# Patient Record
Sex: MALE | Race: BLACK OR AFRICAN AMERICAN | Employment: FULL TIME | URBAN - METROPOLITAN AREA
[De-identification: names, ages, dates, MRNs, and addresses within clinical notes are randomized per-mention and may not be internally consistent; named-entity substitution may affect disease eponyms.]

---

## 2021-02-20 ENCOUNTER — HOSPITAL ENCOUNTER (EMERGENCY)
Age: 21
Discharge: HOME | End: 2021-02-21
Attending: EMERGENCY MEDICINE

## 2021-02-20 ENCOUNTER — APPOINTMENT (OUTPATIENT)
Dept: CT IMAGING | Age: 21
End: 2021-02-20

## 2021-02-20 ENCOUNTER — APPOINTMENT (OUTPATIENT)
Dept: GENERAL RADIOLOGY | Age: 21
End: 2021-02-20

## 2021-02-20 DIAGNOSIS — S09.90XA INJURY OF HEAD, INITIAL ENCOUNTER: ICD-10-CM

## 2021-02-20 DIAGNOSIS — I95.1 ORTHOSTATIC SYNCOPE: Primary | ICD-10-CM

## 2021-02-20 DIAGNOSIS — S02.5XXA CLOSED FRACTURE OF TOOTH, INITIAL ENCOUNTER: ICD-10-CM
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
## 2021-02-20 DIAGNOSIS — S01.81XA FACIAL LACERATION, INITIAL ENCOUNTER: ICD-10-CM

## 2021-02-20 DIAGNOSIS — S02.5XXA: ICD-10-CM

## 2021-02-20 LAB
A/G RATIO: 1.2 (ref 1.1–2.2)
ALBUMIN SERPL-MCNC: 4.6 G/DL (ref 3.4–5)
ALP BLD-CCNC: 63 U/L (ref 40–129)
ALT SERPL-CCNC: 13 U/L (ref 10–40)
ANION GAP SERPL CALCULATED.3IONS-SCNC: 9 MMOL/L (ref 3–16)
AST SERPL-CCNC: 57 U/L (ref 15–37)
BASOPHILS ABSOLUTE: 0.1 K/UL (ref 0–0.2)
BASOPHILS RELATIVE PERCENT: 0.5 %
BILIRUB SERPL-MCNC: 0.5 MG/DL (ref 0–1)
BUN BLDV-MCNC: 11 MG/DL (ref 7–20)
CALCIUM SERPL-MCNC: 9.4 MG/DL (ref 8.3–10.6)
CHLORIDE BLD-SCNC: 101 MMOL/L (ref 99–110)
CO2: 26 MMOL/L (ref 21–32)
CREAT SERPL-MCNC: 1 MG/DL (ref 0.9–1.3)
EOSINOPHILS ABSOLUTE: 0.1 K/UL (ref 0–0.6)
EOSINOPHILS RELATIVE PERCENT: 1.2 %
GFR AFRICAN AMERICAN: >60
GFR NON-AFRICAN AMERICAN: >60
GLOBULIN: 3.7 G/DL
GLUCOSE BLD-MCNC: 154 MG/DL (ref 70–99)
HCT VFR BLD CALC: 45.8 % (ref 40.5–52.5)
HEMOGLOBIN: 15.1 G/DL (ref 13.5–17.5)
LYMPHOCYTES ABSOLUTE: 1.7 K/UL (ref 1–5.1)
LYMPHOCYTES RELATIVE PERCENT: 15 %
MCH RBC QN AUTO: 31.7 PG (ref 26–34)
MCHC RBC AUTO-ENTMCNC: 32.9 G/DL (ref 31–36)
MCV RBC AUTO: 96.3 FL (ref 80–100)
MONOCYTES ABSOLUTE: 0.7 K/UL (ref 0–1.3)
MONOCYTES RELATIVE PERCENT: 6.2 %
NEUTROPHILS ABSOLUTE: 8.6 K/UL (ref 1.7–7.7)
NEUTROPHILS RELATIVE PERCENT: 77.1 %
PDW BLD-RTO: 14.1 % (ref 12.4–15.4)
PLATELET # BLD: 229 K/UL (ref 135–450)
PMV BLD AUTO: 8.2 FL (ref 5–10.5)
POTASSIUM SERPL-SCNC: 3.8 MMOL/L (ref 3.5–5.1)
RBC # BLD: 4.75 M/UL (ref 4.2–5.9)
SODIUM BLD-SCNC: 136 MMOL/L (ref 136–145)
TOTAL PROTEIN: 8.3 G/DL (ref 6.4–8.2)
TROPONIN: <0.01 NG/ML
WBC # BLD: 11.1 K/UL (ref 4–11)

## 2021-02-20 PROCEDURE — 85025 COMPLETE CBC W/AUTO DIFF WBC: CPT

## 2021-02-20 PROCEDURE — 6370000000 HC RX 637 (ALT 250 FOR IP): Performed by: PHYSICIAN ASSISTANT

## 2021-02-20 PROCEDURE — 71045 X-RAY EXAM CHEST 1 VIEW: CPT

## 2021-02-20 PROCEDURE — 70450 CT HEAD/BRAIN W/O DYE: CPT

## 2021-02-20 PROCEDURE — 72125 CT NECK SPINE W/O DYE: CPT

## 2021-02-20 PROCEDURE — 12011 RPR F/E/E/N/L/M 2.5 CM/<: CPT

## 2021-02-20 PROCEDURE — 84484 ASSAY OF TROPONIN QUANT: CPT

## 2021-02-20 PROCEDURE — 70486 CT MAXILLOFACIAL W/O DYE: CPT

## 2021-02-20 PROCEDURE — 93005 ELECTROCARDIOGRAM TRACING: CPT | Performed by: EMERGENCY MEDICINE

## 2021-02-20 PROCEDURE — 80053 COMPREHEN METABOLIC PANEL: CPT

## 2021-02-20 PROCEDURE — 99284 EMERGENCY DEPT VISIT MOD MDM: CPT

## 2021-02-20 RX ORDER — HYDROCODONE BITARTRATE AND ACETAMINOPHEN 5; 325 MG/1; MG/1
1 TABLET ORAL ONCE
Status: COMPLETED | OUTPATIENT
Start: 2021-02-20 | End: 2021-02-20

## 2021-02-20 RX ADMIN — HYDROCODONE BITARTRATE AND ACETAMINOPHEN 1 TABLET: 5; 325 TABLET ORAL at 23:16

## 2021-02-20 RX ADMIN — Medication 3 ML: at 23:17

## 2021-02-20 ASSESSMENT — ENCOUNTER SYMPTOMS
RHINORRHEA: 0
ABDOMINAL PAIN: 0
WHEEZING: 0
COUGH: 0
NAUSEA: 0
VOMITING: 0
SHORTNESS OF BREATH: 0
DIARRHEA: 0

## 2021-02-20 ASSESSMENT — PAIN SCALES - GENERAL: PAINLEVEL_OUTOF10: 9

## 2021-02-21 VITALS
RESPIRATION RATE: 21 BRPM | HEIGHT: 71 IN | HEART RATE: 95 BPM | WEIGHT: 160 LBS | BODY MASS INDEX: 22.4 KG/M2 | OXYGEN SATURATION: 99 % | TEMPERATURE: 98.1 F | SYSTOLIC BLOOD PRESSURE: 121 MMHG | DIASTOLIC BLOOD PRESSURE: 80 MMHG

## 2021-02-21 LAB
EKG ATRIAL RATE: 95 BPM
EKG DIAGNOSIS: NORMAL
EKG P AXIS: 68 DEGREES
EKG P-R INTERVAL: 120 MS
EKG Q-T INTERVAL: 352 MS
EKG QRS DURATION: 108 MS
EKG QTC CALCULATION (BAZETT): 442 MS
EKG R AXIS: 69 DEGREES
EKG T AXIS: 49 DEGREES
EKG VENTRICULAR RATE: 95 BPM

## 2021-02-21 PROCEDURE — 6370000000 HC RX 637 (ALT 250 FOR IP): Performed by: EMERGENCY MEDICINE

## 2021-02-21 PROCEDURE — 2580000003 HC RX 258: Performed by: EMERGENCY MEDICINE

## 2021-02-21 PROCEDURE — 93010 ELECTROCARDIOGRAM REPORT: CPT | Performed by: INTERNAL MEDICINE

## 2021-02-21 RX ORDER — MAGNESIUM HYDROXIDE/ALUMINUM HYDROXICE/SIMETHICONE 120; 1200; 1200 MG/30ML; MG/30ML; MG/30ML
30 SUSPENSION ORAL ONCE
Status: DISCONTINUED | OUTPATIENT
Start: 2021-02-21 | End: 2021-02-21 | Stop reason: HOSPADM

## 2021-02-21 RX ORDER — HYDROCODONE BITARTRATE AND ACETAMINOPHEN 5; 325 MG/1; MG/1
1 TABLET ORAL EVERY 6 HOURS PRN
Qty: 12 TABLET | Refills: 0 | Status: SHIPPED | OUTPATIENT
Start: 2021-02-21 | End: 2021-02-24

## 2021-02-21 RX ORDER — AMOXICILLIN 500 MG/1
500 CAPSULE ORAL 3 TIMES DAILY
Qty: 15 CAPSULE | Refills: 0 | Status: SHIPPED | OUTPATIENT
Start: 2021-02-21 | End: 2021-02-26

## 2021-02-21 RX ORDER — 0.9 % SODIUM CHLORIDE 0.9 %
1000 INTRAVENOUS SOLUTION INTRAVENOUS ONCE
Status: COMPLETED | OUTPATIENT
Start: 2021-02-21 | End: 2021-02-21

## 2021-02-21 RX ORDER — LIDOCAINE HYDROCHLORIDE 20 MG/ML
15 SOLUTION OROPHARYNGEAL
Status: DISCONTINUED | OUTPATIENT
Start: 2021-02-21 | End: 2021-02-21 | Stop reason: HOSPADM

## 2021-02-21 RX ADMIN — LIDOCAINE HYDROCHLORIDE 15 ML: 20 SOLUTION ORAL; TOPICAL at 01:36

## 2021-02-21 RX ADMIN — SODIUM CHLORIDE 1000 ML: 9 INJECTION, SOLUTION INTRAVENOUS at 00:35

## 2021-02-21 ASSESSMENT — PAIN SCALES - GENERAL: PAINLEVEL_OUTOF10: 8

## 2021-02-21 NOTE — ED PROVIDER NOTES
I independently performed a history and physical on Sandy Avina. All diagnostic, treatment, and disposition decisions were made by myself in conjunction with the advanced practice provider. Briefly, this is a 23 y.o. male here for syncope with resultant trauma from the fall. Patient states he just woken up from a nap and went up a flight of stairs. When he got to the top, he states he \"felt fuzzy\", then passed out. He had remembers waking up at the bottom of the steps on the floor. And falling on the stairs, he hit his head and developed an abrasion to his left cheek. He also has a small laceration to his right upper lip and broke multiple teeth. His pain is severe at this time. He states he has passed out like this before. On exam, patient has superficial abrasion to his left cheek with no active bleeding. There is no deformity to the face or facial bones. He has no free movement of the midface. There is stage II dental fractures of the eighth and ninth teeth as well as a stage III dental fracture of the 10th tooth. Patient has a small, half centimeter laceration to superior to the right lip. There is an abrasion to his lower lip without laceration. Patient is awake, alert, and oriented. Heart is tachycardic, regular. Lungs are clear to auscultation. EKG  The Ekg interpreted by me in the absence of a cardiologist shows. normal sinus rhythm with a rate of 95  Axis is   Normal  QTc is  normal  Incomplete RBBB, likely normal variant    No specific ST-T wave changes appreciated. No evidence of acute ischemia. No previous EKGs available for comparison.     MDM I estimate there is LOW risk for ACUTE CORONARY SYNDROME, INTRACRANIAL HEMORRHAGE, MALIGNANT DYSRHYTHMIA, MALIGNANT HYPERTENSION, PULMONARY EMBOLISM, SEPSIS, SUBARACHNOID HEMORRHAGE, SUBDURAL HEMATOMA, STROKE, or THORACIC AORTIC DISSECTION, thus I consider the discharge disposition reasonable. Patient Referrals:  [facility] Emergency Department  [address]  [phone]    As needed, If symptoms worsen or new symptoms develop    [facility]  [address] [facility] [address], [address]  [phone]  On 2/21/2021      [address]  [address] [facility], [address]  Emergency Dental Line - [phone]  Regular line - [phone]  On 2/21/2021      Shumel Mc MD      for re-evaluation      Discharge Medications:  Discharge Medication List as of 2/21/2021  2:21 AM      START taking these medications    Details   HYDROcodone-acetaminophen (NORCO) 5-325 MG per tablet Take 1 tablet by mouth every 6 hours as needed for Pain for up to 3 days. , Disp-12 tablet, R-0Normal      amoxicillin (AMOXIL) 500 MG capsule Take 1 capsule by mouth 3 times daily for 5 days, Disp-15 capsule, R-0Normal             FINAL IMPRESSION  1. Orthostatic syncope    2. Fall down stairs, initial encounter    3. Injury of head, initial encounter    4. Closed fracture of tooth, initial encounter    5. Facial laceration, initial encounter    6. Closed traumatic fracture of tooth, initial encounter        Blood pressure 121/80, pulse 95, temperature 98.1 °F (36.7 °C), resp. rate 21, height 5' 11\" (1.803 m), weight 160 lb (72.6 kg), SpO2 99 %.      For further details of Pretty Lala's emergency department encounter, please see documentation by advanced practice provider, Ariadna Ferguson, [address] [facility], [place]  02/21/21 5665

## 2021-02-21 NOTE — ED PROVIDER NOTES
[address] [facility]        Pt Name: Lucina Lucio  MRN: [id number]  [place] 2000  Date of evaluation: 2/20/2021  Provider: J Carlos El PA-C  PCP: No primary care provider on file. I have seen and evaluated this patient with my supervising physician Dr. Shane Lora. CHIEF COMPLAINT       Chief Complaint   Patient presents with   Acquanetta Ruddle     states he got up too fast, fell down ten steps, + LOC. hit face. lac to lip, broken teeth. abrasion to L cheek. HISTORY OF PRESENT ILLNESS   (Location, Timing/Onset, Context/Setting, Quality, Duration, Modifying Factors, Severity, Associated Signs and Symptoms)  Note limiting factors. Lucina Lucio is a 23 y.o. male who presents for evaluation after fall downstairs. Patient states that he believes he lost consciousness. States that he was taking a nap and jumped up to run upstairs to one of his friends when he got to the top of the stairs started feeling lightheaded and next he knows he is at the bottom of the steps. He is complaining of mouth pain. He denies any significant headache. No dizziness/lightheadedness, weakness or visual disturbances currently. No chest pain or shortness of breath. No abdominal pain nausea vomiting. No leg pain or swelling. No history of similar symptoms or blood clots. Is not anticoagulated. No recent travel hospitalizations or operations. He has no other complaints or concerns at this time. Nursing Notes were all reviewed and agreed with or any disagreements were addressed in the HPI. REVIEW OF SYSTEMS    (2-9 systems for level 4, 10 or more for level 5)     Review of Systems   Constitutional: Negative for appetite change, chills and fever. HENT: Negative for congestion and rhinorrhea. Eyes: Negative for visual disturbance. Respiratory: Negative for cough, shortness of breath and wheezing. Right eye: No discharge. Left eye: No discharge. Extraocular Movements: Extraocular movements intact. Conjunctiva/sclera: Conjunctivae normal.      Pupils: Pupils are equal, round, and reactive to light. Neck:      Musculoskeletal: Normal range of motion and neck supple. Cardiovascular:      Rate and Rhythm: Normal rate and regular rhythm. Heart sounds: Normal heart sounds. Pulmonary:      Effort: Pulmonary effort is normal. No respiratory distress. Breath sounds: Normal breath sounds. Chest:      Chest wall: No tenderness. Abdominal:      General: There is no distension. Palpations: Abdomen is soft. Tenderness: There is no abdominal tenderness. Musculoskeletal: Normal range of motion. Skin:     General: Skin is warm and dry. Neurological:      Mental Status: He is alert and oriented to person, place, and time. Psychiatric:         Behavior: Behavior normal.         DIAGNOSTIC RESULTS   LABS:    Labs Reviewed   CBC WITH AUTO DIFFERENTIAL - Abnormal; Notable for the following components:       Result Value    WBC 11.1 (*)     Neutrophils Absolute 8.6 (*)     All other components within normal limits    Narrative:     Performed at:  [facility] [address], [place], [address] [place], [place] [address]   Phone [phone]   COMPREHENSIVE METABOLIC PANEL   TROPONIN       All other labs were within normal range or not returned as of this dictation. EKG: All EKG's are interpreted by the Emergency Department Physician in the absence of a cardiologist.  Please see their note for interpretation of EKG.       RADIOLOGY:   Non-plain film images such as CT, Ultrasound and MRI are read by the radiologist. Plain radiographic images are visualized and preliminarily interpreted by the ED Provider with the below findings:        Interpretation per the Radiologist below, if available at the time of this note:    [address] [place] [address] Preliminary Result   1. No acute intracranial abnormality. 2. Fractures involving the medial maxillary incisors bilaterally. 3. No other acute facial bone fracture is identified. CT CERVICAL SPINE WO CONTRAST   Final Result   No evidence of an acute fracture or traumatic malalignment involving the   cervical spine         CT HEAD WO CONTRAST   Preliminary Result   1. No acute intracranial abnormality. 2. Fractures involving the medial maxillary incisors bilaterally. 3. No other acute facial bone fracture is identified. XR CHEST PORTABLE   Final Result   1. No acute cardiopulmonary disease. No results found. PROCEDURES   Unless otherwise noted below, none     Procedures  Laceration Repair Procedure Note    Indication: Laceration    Procedure: The patient was placed in the appropriate position and anesthesia around the laceration was obtained by infiltration using LET gel. The area was then cleansed with betadine and draped in a sterile fashion and irrigated with Shur-Clens and normal saline. The laceration was closed with 5-0 Vicryl Rapide using interrupted sutures. There were no additional lacerations requiring repair. The wound area was then dressed with bacitracin. Total repaired wound length: 0.5 cm. Other Items: Suture count: 1    The patient tolerated the procedure well.     Complications: None      CRITICAL CARE TIME   N/A    CONSULTS:  None      EMERGENCY DEPARTMENT COURSE and DIFFERENTIAL DIAGNOSIS/MDM:   Vitals:    Vitals:    02/20/21 2230   BP: (!) 156/83   Pulse: 100   Resp: 17   Temp: 98.1 °F (36.7 °C)   SpO2: 99%   Weight: 160 lb (72.6 kg)   Height: 5' 11\" (1.803 m)       Patient was given the following medications:  Medications   HYDROcodone-acetaminophen (NORCO) 5-325 MG per tablet 1 tablet (1 tablet Oral Given 2/20/21 5252)   lidocaine-EPINEPHrine-tetracaine (LET) topical solution 3 mL syringe (3 mLs Topical Given 2/20/21 1035) Patient presents for evaluation after syncopal episode and fall on stairs. On exam, he is upset and tearful in no acute distress nontoxic. Vitals are stable and he is afebrile. He has an abrasion noted to his left upper cheek, small laceration to right upper lip, several dental fractures and small laceration to inner aspect of the lower mid lip. There is no active bleeding. Remainder of scalp is atraumatic, neck and back are nontender. Lungs are clear to auscultation bilaterally, chest is nontender and abdomen is benign. He is moving all extremities without difficulty or neurologic deficit. There is no additional reproducible tenderness. He is able to ambulate but states that he feels shaky. He was given Norco for symptomatic relief and will be reevaluated. Tetanus is up-to-date. See attending note for EKG interpretation. Wound care provided by nursing staff. Laceration repaired per procedure note. Patient tolerated that difficulty. Portable chest x-ray shows no acute cardiopulmonary disease. Remainder of work-up including labs, CT of the head facial bones and cervical spine are pending at end of shift. Please see attending note for additional information regarding these results, patient reevaluation and disposition. FINAL IMPRESSION      1. Orthostatic syncope    2. Fall down stairs, initial encounter    3. Injury of head, initial encounter    4. Closed fracture of tooth, initial encounter    5. Facial laceration, initial encounter          DISPOSITION/PLAN   DISPOSITION        PATIENT REFERREDTO:  No follow-up provider specified.     DISCHARGE MEDICATIONS:  New Prescriptions    No medications on file       DISCONTINUED MEDICATIONS:  Discontinued Medications    No medications on file              (Please note that portions of this note were completed with a voice recognition program.  Efforts were made to edit the dictations but occasionally words are mis-transcribed.) Santosh Jameson PA-C (electronically signed)           [place], [place]  02/20/21 2448

## 2021-02-21 NOTE — ED NOTES
Discharge instructions provided to patient by RN at bedside. No further concerns addressed at this time.      Jez Taveras RN  02/21/21 6075